# Patient Record
Sex: MALE
[De-identification: names, ages, dates, MRNs, and addresses within clinical notes are randomized per-mention and may not be internally consistent; named-entity substitution may affect disease eponyms.]

---

## 2024-03-01 ENCOUNTER — CARE COORDINATION (OUTPATIENT)
Dept: OTHER | Facility: CLINIC | Age: 69
End: 2024-03-01

## 2024-03-01 NOTE — CARE COORDINATION
Ambulatory Care Coordination Note    ACM attempted to reach patient for introduction to Care Management related to CM r/t [facility] dx DM A1C 10.0 10/10/2023. HIPAA compliant message left requesting a return phone call at patient convenience.     Plan for follow-up call in 10-14 days      No future appointments.

## 2024-03-14 ENCOUNTER — CARE COORDINATION (OUTPATIENT)
Dept: OTHER | Facility: CLINIC | Age: 69
End: 2024-03-14

## 2024-03-14 NOTE — CARE COORDINATION
Ambulatory Care Coordination Note    ACM attempted 2nd outreach to patient for introduction to Care Management related to [facility] dx DM A1C 10.0 10/10/2023. HIPAA compliant message left requesting a return phone call at patient convenience.     No letters available to send at this time.     Will continue to outreach.      No future appointments.

## 2024-03-25 ENCOUNTER — CARE COORDINATION (OUTPATIENT)
Dept: OTHER | Facility: CLINIC | Age: 69
End: 2024-03-25

## 2024-03-25 NOTE — CARE COORDINATION
Ambulatory Care Coordination Note    ACM attempted third and final call to patient for introduction to Care Management. HIPAA compliant message left requesting a return phone call at patient convenience.    No letters available to send at this time.     No further outreach scheduled with this ACM, patient has been provided with this ACM's contact information.  ACM will sign off care team at this time.     No future appointments.